# Patient Record
Sex: MALE | Race: WHITE
[De-identification: names, ages, dates, MRNs, and addresses within clinical notes are randomized per-mention and may not be internally consistent; named-entity substitution may affect disease eponyms.]

---

## 2020-07-06 ENCOUNTER — HOSPITAL ENCOUNTER (OUTPATIENT)
Dept: HOSPITAL 76 - LAB.S | Age: 75
Discharge: HOME | End: 2020-07-06
Attending: FAMILY MEDICINE
Payer: MEDICARE

## 2020-07-06 DIAGNOSIS — R94.31: ICD-10-CM

## 2020-07-06 DIAGNOSIS — I25.10: Primary | ICD-10-CM

## 2020-07-06 DIAGNOSIS — D75.1: ICD-10-CM

## 2020-07-06 DIAGNOSIS — E78.5: ICD-10-CM

## 2020-07-06 LAB
ALBUMIN DIAFP-MCNC: 4.5 G/DL (ref 3.2–5.5)
ALBUMIN/GLOB SERPL: 1.9 {RATIO} (ref 1–2.2)
ALP SERPL-CCNC: 40 IU/L (ref 42–121)
ALT SERPL W P-5'-P-CCNC: 59 IU/L (ref 10–60)
ANION GAP SERPL CALCULATED.4IONS-SCNC: 9 MMOL/L (ref 6–13)
AST SERPL W P-5'-P-CCNC: 27 IU/L (ref 10–42)
BASOPHILS NFR BLD AUTO: 0.1 10^3/UL (ref 0–0.1)
BASOPHILS NFR BLD AUTO: 1 %
BILIRUB BLD-MCNC: 1.4 MG/DL (ref 0.2–1)
BUN SERPL-MCNC: 21 MG/DL (ref 6–20)
CALCIUM UR-MCNC: 9.2 MG/DL (ref 8.5–10.3)
CHLORIDE SERPL-SCNC: 101 MMOL/L (ref 101–111)
CHOLEST SERPL-MCNC: 78 MG/DL
CO2 SERPL-SCNC: 30 MMOL/L (ref 21–32)
CREAT SERPLBLD-SCNC: 1 MG/DL (ref 0.6–1.2)
EOSINOPHIL # BLD AUTO: 0.2 10^3/UL (ref 0–0.7)
EOSINOPHIL NFR BLD AUTO: 2.9 %
ERYTHROCYTE [DISTWIDTH] IN BLOOD BY AUTOMATED COUNT: 14.5 % (ref 12–15)
GLOBULIN SER-MCNC: 2.4 G/DL (ref 2.1–4.2)
GLUCOSE SERPL-MCNC: 126 MG/DL (ref 70–100)
HDLC SERPL-MCNC: 30 MG/DL
HDLC SERPL: 2.6 {RATIO} (ref ?–5)
HGB UR QL STRIP: 17.1 G/DL (ref 14–18)
LDLC SERPL CALC-MCNC: 31 MG/DL
LDLC/HDLC SERPL: 1 {RATIO} (ref ?–3.6)
LYMPHOCYTES # SPEC AUTO: 2.1 10^3/UL (ref 1.5–3.5)
LYMPHOCYTES NFR BLD AUTO: 33.8 %
MCH RBC QN AUTO: 30.9 PG (ref 27–31)
MCHC RBC AUTO-ENTMCNC: 34.3 G/DL (ref 32–36)
MCV RBC AUTO: 90.1 FL (ref 80–94)
MONOCYTES # BLD AUTO: 0.6 10^3/UL (ref 0–1)
MONOCYTES NFR BLD AUTO: 10.1 %
NEUTROPHILS # BLD AUTO: 3.2 10^3/UL (ref 1.5–6.6)
NEUTROPHILS # SNV AUTO: 6.2 X10^3/UL (ref 4.8–10.8)
NEUTROPHILS NFR BLD AUTO: 51.2 %
PDW BLD AUTO: 12.6 FL (ref 7.4–11.4)
PLATELET # BLD: 155 10^3/UL (ref 130–450)
PROT SPEC-MCNC: 6.9 G/DL (ref 6.7–8.2)
RBC MAR: 5.53 10^6/UL (ref 4.7–6.1)
SODIUM SERPLBLD-SCNC: 140 MMOL/L (ref 135–145)
VLDLC SERPL-SCNC: 17 MG/DL

## 2020-07-06 PROCEDURE — 83721 ASSAY OF BLOOD LIPOPROTEIN: CPT

## 2020-07-06 PROCEDURE — 80061 LIPID PANEL: CPT

## 2020-07-06 PROCEDURE — 80053 COMPREHEN METABOLIC PANEL: CPT

## 2020-07-06 PROCEDURE — 84443 ASSAY THYROID STIM HORMONE: CPT

## 2020-07-06 PROCEDURE — 36415 COLL VENOUS BLD VENIPUNCTURE: CPT

## 2020-07-06 PROCEDURE — 85025 COMPLETE CBC W/AUTO DIFF WBC: CPT

## 2020-09-25 ENCOUNTER — HOSPITAL ENCOUNTER (OUTPATIENT)
Dept: HOSPITAL 76 - SC | Age: 75
Discharge: HOME | End: 2020-09-25
Attending: NURSE PRACTITIONER
Payer: COMMERCIAL

## 2020-09-25 DIAGNOSIS — R06.83: Primary | ICD-10-CM

## 2020-09-25 DIAGNOSIS — I51.9: ICD-10-CM

## 2020-09-25 DIAGNOSIS — R06.81: ICD-10-CM

## 2020-09-25 PROCEDURE — 95806 SLEEP STUDY UNATT&RESP EFFT: CPT

## 2020-10-13 ENCOUNTER — HOSPITAL ENCOUNTER (OUTPATIENT)
Dept: HOSPITAL 76 - SC | Age: 75
Discharge: HOME | End: 2020-10-13
Attending: NURSE PRACTITIONER
Payer: COMMERCIAL

## 2020-10-13 DIAGNOSIS — E66.3: ICD-10-CM

## 2020-10-13 DIAGNOSIS — G47.10: ICD-10-CM

## 2020-10-13 DIAGNOSIS — I25.10: ICD-10-CM

## 2020-10-13 DIAGNOSIS — R06.83: Primary | ICD-10-CM

## 2020-10-13 DIAGNOSIS — R06.81: ICD-10-CM

## 2020-10-13 PROCEDURE — 99212 OFFICE O/P EST SF 10 MIN: CPT

## 2020-10-13 PROCEDURE — 99213 OFFICE O/P EST LOW 20 MIN: CPT

## 2020-10-13 NOTE — SLEEP CARE CONSULTATION
Information from patient questionnaire entered by Felicita Polk.





I have reviewed and concur with the information entered by Felicita Polk. This 

document represents the service I personally performed and the decisions made by

me, Ute Yanes ARNP.





History of Present Illness


Service Date and Time: 10/13/2020    1658


Initial [place] Sleepiness Scale score: 4 (in 2020)


Current [place] Sleepiness Scale score: 1


Additional HPI information: 





CONRAD KAPOOR returns for follow up and results of the recently performed home 

sleep study.





The patient was informed of the following findings: Patient calculated AHI was 

3.6 showing no significant sleep disordered breathing but he did have an 

elevated supine AHI of 7.7. His study quality was also considered fair due to a 

partial loss of airflow signal.





I explained the pathophysiology behind obstructive sleep apnea. Patient does not

have sleep apnea and was advised how weight gain could increase the risk of 

developing sleep apnea in the future.  Patient does not have significant sleep 

disordered breathing but has elevated AHI in supine position so advised 

positional therapy. Methods to achieve positional management therapy were 

discussed; such as, positioning with pillows, wearing a T-shirt with tennis 

balls sewn into the back, Rematee shirt, Zzomba belt and Slumberbump belt. 

Pamphlets provided on how to obtain the commercially available products.





Patient has light snoring. Snoring can be reduced by weight loss. Weight loss is

best achieved with diet consult. Patient instructed to contact PCP for referral.

Snoring can also be treated with an oral appliance from a dentist. Advised to 

check insurance coverage. In addition, an ENT evaluation can be do to see if 

other treatment is indicated.








Sleep Study





- Results


Type of Sleep Study: Home sleep study


Prior sleep studies: No


Polysomnography/Home Sleep Study results: 





SLEEP TIME AND EFFICIENCY: The sleep study recording began at 11:07:13 PM and 

ended at 08:33:21 AM. Total


recording time was 566.1 minutes. The total sleep time was 517.0 minutes. The 

sleep efficiency was 91.3 percent. The


patient spent 148.6 minutes supine, and spent 368.4 minutes non-supine. The 

patients own estimate of sleep time was


9.40 hours.


RESPIRATORY DATA: The AHI in this report is indexed to sleep time based on 

actigraphy. The AASM defines this as JENNIFER.


The AHI on this type 3 Home Sleep Study may understate the AHI determined on a 

type 1 or 2 study, since EEG is not


monitored resulting in the inability to score non-desaturating hypopneas.


Based on 4% Calculation:


The AHI4% calculation of 3.6 per hour of recording time was based on a total of 

25 scored apneas and 6 scored


hypopneas with 4% desaturations. Supine AHI4%: 7.7 per hour. Non-supine AHI4%: 

2.0 per hour.


Oxygen Summary:


Patient's baseline O2 saturation was 95.0 %. The patient spent 4.3 minutes at an

oxygen saturation less than 90%, and


0.9 minutes less than 85%. The desaturation index was 1.5 events per hour sleep 

time. The lowest saturation was 70.1


%.


SNORING: The percent of the study time spent snoring was 0.0 %. The Snoring 

Count was 4 . The Snoring Index was 0.5


.


PULSE RATE REVIEW: The mean heart rate was 65 beats per minute. The rate ranged 

from a low of 42 to a high of 94


beats per minute.








DIAGNOSIS CODE: suspected Obstructive Sleep Apnea (ICD-10 G47.30)





Allergies and Home Medications


Drug allergies reviewed: Yes (NKDA)


Home medication list reviewed: Yes (no changes)





Review of Systems


Review of systems same as previous: Yes (no changes)





Physical Exam


Heart Rate: 64


O2 Saturation: 95


Height: 6 ft 1.5 in


Weight: 207 lb


Body Mass Index: 26.9


BMI Classification: Overweight





Impression and Plan





1. Suspected Obstructive Sleep Apnea-Hypopnea Syndrome, as suggested by a 

history of loud and irregular snoring, observed cessation of breath while 

asleep, and excessive daytime sleepiness. Patient has a history of coronary 

heart disease. Patient HST study quality fair due to partial loss of airflow 

signal during the study. We got a suboptimal study with an elevated supine AHI 

of 7.7 and non-supine AHI 2.0. I think we should repeat study in lab to obtain 

better quality study and adequate measurement of his sleep disordered breathing.

Patient declined another study at this time. He was advised to inform any of his

doctors/surgeon that he does have mild obstructive sleep apnea when sleeping 

supine to avoid complications with anesthesia. He was instructed to avoid 

sleeping supine to reduce his overall health risk from apnea. Since patients 

apnea is primarily in supine position, patient advised to try positional therapy

and agreed with plan. Patient will follow up as needed or if he decides he would

like to repeat a sleep study.





2. Snoring but no significant sleep disordered breathing. Patient advised that 

often weight loss will reduce snoring as well as apnea risk. An oral appliance 

can also be used for snoring.





* Attempt to lose weight


* Avoid alcohol consumption near bedtime


* The patient is cautioned about driving until sleepiness is completely 

  resolved.


* Return as needed for follow up.














Counseling Topics: Sleeping position


Visit Type: In Office


Time Spent with Patient (minutes): 18


Provider Statement: I spent 100% of the Face to Face Visit with the patient with

greater than 50% spent counseling the patient and coordination of care.

## 2021-06-11 ENCOUNTER — HOSPITAL ENCOUNTER (OUTPATIENT)
Dept: HOSPITAL 76 - LAB.S | Age: 76
Discharge: HOME | End: 2021-06-11
Attending: INTERNAL MEDICINE
Payer: MEDICARE

## 2021-06-11 DIAGNOSIS — I25.10: Primary | ICD-10-CM

## 2021-06-11 LAB
CHOLEST SERPL-MCNC: 77 MG/DL
HDLC SERPL-MCNC: 28 MG/DL
HDLC SERPL: 2.8 {RATIO} (ref ?–5)
LDLC SERPL CALC-MCNC: 29 MG/DL
LDLC/HDLC SERPL: 1 {RATIO} (ref ?–3.6)
TRIGL P FAST SERPL-MCNC: 101 MG/DL
VLDLC SERPL-SCNC: 20 MG/DL

## 2021-06-11 PROCEDURE — 36415 COLL VENOUS BLD VENIPUNCTURE: CPT

## 2021-06-11 PROCEDURE — 83721 ASSAY OF BLOOD LIPOPROTEIN: CPT

## 2021-06-11 PROCEDURE — 80061 LIPID PANEL: CPT

## 2021-08-05 ENCOUNTER — HOSPITAL ENCOUNTER (OUTPATIENT)
Dept: HOSPITAL 76 - LAB.S | Age: 76
Discharge: HOME | End: 2021-08-05
Attending: INTERNAL MEDICINE
Payer: MEDICARE

## 2021-08-05 DIAGNOSIS — Z12.5: ICD-10-CM

## 2021-08-05 DIAGNOSIS — D75.1: Primary | ICD-10-CM

## 2021-08-05 DIAGNOSIS — Z79.899: ICD-10-CM

## 2021-08-05 LAB
ALBUMIN DIAFP-MCNC: 4.2 G/DL (ref 3.2–5.5)
ALBUMIN/GLOB SERPL: 1.8 {RATIO} (ref 1–2.2)
ALP SERPL-CCNC: 44 IU/L (ref 42–121)
ALT SERPL W P-5'-P-CCNC: 77 IU/L (ref 10–60)
ANION GAP SERPL CALCULATED.4IONS-SCNC: 7 MMOL/L (ref 6–13)
AST SERPL W P-5'-P-CCNC: 35 IU/L (ref 10–42)
BASOPHILS NFR BLD AUTO: 0.1 10^3/UL (ref 0–0.1)
BASOPHILS NFR BLD AUTO: 1.5 %
BILIRUB BLD-MCNC: 1.1 MG/DL (ref 0.2–1)
BUN SERPL-MCNC: 18 MG/DL (ref 6–20)
CALCIUM UR-MCNC: 8.8 MG/DL (ref 8.5–10.3)
CHLORIDE SERPL-SCNC: 105 MMOL/L (ref 101–111)
CO2 SERPL-SCNC: 27 MMOL/L (ref 21–32)
CREAT SERPLBLD-SCNC: 0.9 MG/DL (ref 0.6–1.2)
EOSINOPHIL # BLD AUTO: 0.2 10^3/UL (ref 0–0.7)
EOSINOPHIL NFR BLD AUTO: 3.9 %
ERYTHROCYTE [DISTWIDTH] IN BLOOD BY AUTOMATED COUNT: 14.7 % (ref 12–15)
GFRSERPLBLD MDRD-ARVRAT: 82 ML/MIN/{1.73_M2} (ref 89–?)
GLOBULIN SER-MCNC: 2.4 G/DL (ref 2.1–4.2)
GLUCOSE SERPL-MCNC: 132 MG/DL (ref 70–100)
HCT VFR BLD AUTO: 51.9 % (ref 42–52)
HGB UR QL STRIP: 17.7 G/DL (ref 14–18)
LYMPHOCYTES # SPEC AUTO: 1.7 10^3/UL (ref 1.5–3.5)
LYMPHOCYTES NFR BLD AUTO: 31.5 %
MCH RBC QN AUTO: 30.4 PG (ref 27–31)
MCHC RBC AUTO-ENTMCNC: 34.1 G/DL (ref 32–36)
MCV RBC AUTO: 89 FL (ref 80–94)
MONOCYTES # BLD AUTO: 0.6 10^3/UL (ref 0–1)
MONOCYTES NFR BLD AUTO: 10.1 %
NEUTROPHILS # BLD AUTO: 2.8 10^3/UL (ref 1.5–6.6)
NEUTROPHILS # SNV AUTO: 5.4 X10^3/UL (ref 4.8–10.8)
NEUTROPHILS NFR BLD AUTO: 52.1 %
NRBC # BLD AUTO: 0 /100WBC
NRBC # BLD AUTO: 0 X10^3/UL
PDW BLD AUTO: 12.2 FL (ref 7.4–11.4)
PLAT MORPH BLD: (no result)
PLATELET # BLD: 158 10^3/UL (ref 130–450)
PLATELET BLD QL SMEAR: (no result)
POTASSIUM SERPL-SCNC: 4 MMOL/L (ref 3.5–5)
PROT SPEC-MCNC: 6.6 G/DL (ref 6.7–8.2)
RBC MAR: 5.83 10^6/UL (ref 4.7–6.1)
RBC MORPH BLD: (no result)
SODIUM SERPLBLD-SCNC: 139 MMOL/L (ref 135–145)
WBC MORPH BLD: (no result)

## 2021-08-05 PROCEDURE — 84153 ASSAY OF PSA TOTAL: CPT

## 2021-08-05 PROCEDURE — 82728 ASSAY OF FERRITIN: CPT

## 2021-08-05 PROCEDURE — 36415 COLL VENOUS BLD VENIPUNCTURE: CPT

## 2021-08-05 PROCEDURE — 85025 COMPLETE CBC W/AUTO DIFF WBC: CPT

## 2021-08-05 PROCEDURE — 80053 COMPREHEN METABOLIC PANEL: CPT

## 2022-02-25 ENCOUNTER — OFFICE VISIT (OUTPATIENT)
Dept: URBAN - METROPOLITAN AREA CLINIC 46 | Facility: CLINIC | Age: 77
End: 2022-02-25
Payer: MEDICARE

## 2022-02-25 DIAGNOSIS — H40.1123 PRIMARY OPEN-ANGLE GLAUCOMA, SEVERE STAGE, LEFT EYE: ICD-10-CM

## 2022-02-25 DIAGNOSIS — H40.1131 PRIMARY OPEN-ANGLE GLAUCOMA, BILATERAL, MILD STAGE: ICD-10-CM

## 2022-02-25 DIAGNOSIS — L71.8 OTHER ROSACEA: Primary | ICD-10-CM

## 2022-02-25 PROCEDURE — 76514 ECHO EXAM OF EYE THICKNESS: CPT | Performed by: OPHTHALMOLOGY

## 2022-02-25 PROCEDURE — 92133 CPTRZD OPH DX IMG PST SGM ON: CPT | Performed by: OPHTHALMOLOGY

## 2022-02-25 PROCEDURE — 92020 GONIOSCOPY: CPT | Performed by: OPHTHALMOLOGY

## 2022-02-25 PROCEDURE — 99204 OFFICE O/P NEW MOD 45 MIN: CPT | Performed by: OPHTHALMOLOGY

## 2022-02-25 RX ORDER — DOXYCYCLINE HYCLATE 100 MG/1
100 MG TABLET, COATED ORAL
Qty: 60 | Refills: 2 | Status: ACTIVE
Start: 2022-02-25

## 2022-02-25 RX ORDER — LATANOPROST 50 UG/ML
0.005 % SOLUTION OPHTHALMIC
Qty: 2.5 | Refills: 11 | Status: ACTIVE
Start: 2022-02-25

## 2022-02-25 ASSESSMENT — INTRAOCULAR PRESSURE
OS: 24
OD: 14
OD: 18
OS: 21

## 2022-02-25 NOTE — IMPRESSION/PLAN
Impression: Primary open-angle glaucoma, severe stage, left eye: H40.1123. Plan: Start Latanoprost at bedtime in both eyes, Stop Alphagan P in both eyes.

## 2022-02-25 NOTE — IMPRESSION/PLAN
Impression: Primary open-angle glaucoma, mild stage, right eye: H40.1111. Plan: Start Latanoprost at bedtime in both eyes, Stop Alphagan P in both eyes.

## 2022-02-25 NOTE — IMPRESSION/PLAN
Impression: Other rosacea: L71.8. Plan: Start Doxy 100 mg PO twice daily and do warm compress multiple times per day.

## 2022-03-23 ENCOUNTER — TESTING ONLY (OUTPATIENT)
Dept: URBAN - METROPOLITAN AREA CLINIC 46 | Facility: CLINIC | Age: 77
End: 2022-03-23
Payer: MEDICARE

## 2022-03-23 PROCEDURE — 92083 EXTENDED VISUAL FIELD XM: CPT | Performed by: OPHTHALMOLOGY

## 2022-03-25 ENCOUNTER — OFFICE VISIT (OUTPATIENT)
Dept: URBAN - METROPOLITAN AREA CLINIC 46 | Facility: CLINIC | Age: 77
End: 2022-03-25
Payer: MEDICARE

## 2022-03-25 DIAGNOSIS — H40.1122 PRIMARY OPEN-ANGLE GLAUCOMA, MODERATE STAGE, LEFT EYE: ICD-10-CM

## 2022-03-25 DIAGNOSIS — H26.491 OTHER SECONDARY CATARACT, RIGHT EYE: ICD-10-CM

## 2022-03-25 DIAGNOSIS — H40.1111 PRIMARY OPEN-ANGLE GLAUCOMA, MILD STAGE, RIGHT EYE: Primary | ICD-10-CM

## 2022-03-25 PROCEDURE — 99213 OFFICE O/P EST LOW 20 MIN: CPT | Performed by: OPHTHALMOLOGY

## 2022-03-25 ASSESSMENT — INTRAOCULAR PRESSURE
OD: 19
OS: 20
OS: 16
OD: 14

## 2022-03-25 NOTE — IMPRESSION/PLAN
Impression: Primary open-angle glaucoma, mild stage, right eye: H40.1111.  Plan: - Continue as previously prescribed Latanoprost 0.005 % eye drops : Instill one drop in both eyes at bedtime

## 2022-11-29 ENCOUNTER — OFFICE VISIT (OUTPATIENT)
Dept: URBAN - METROPOLITAN AREA CLINIC 50 | Facility: CLINIC | Age: 77
End: 2022-11-29
Payer: MEDICARE

## 2022-11-29 DIAGNOSIS — H26.491 OTHER SECONDARY CATARACT, RIGHT EYE: ICD-10-CM

## 2022-11-29 DIAGNOSIS — H40.1131 PRIMARY OPEN-ANGLE GLAUCOMA, MILD STAGE, BILATERAL: Primary | ICD-10-CM

## 2022-11-29 PROCEDURE — 99214 OFFICE O/P EST MOD 30 MIN: CPT | Performed by: OPHTHALMOLOGY

## 2022-11-29 RX ORDER — DOXYCYCLINE HYCLATE 100 MG/1
100 MG TABLET, COATED ORAL
Qty: 60 | Refills: 2 | Status: ACTIVE
Start: 2022-11-29

## 2022-11-29 RX ORDER — LATANOPROST 50 UG/ML
0.005 % SOLUTION OPHTHALMIC
Qty: 2.5 | Refills: 11 | Status: ACTIVE
Start: 2022-11-29

## 2022-11-29 ASSESSMENT — INTRAOCULAR PRESSURE
OS: 20
OD: 22
OD: 15
OS: 19

## 2023-02-27 ENCOUNTER — TESTING ONLY (OUTPATIENT)
Dept: URBAN - METROPOLITAN AREA CLINIC 46 | Facility: CLINIC | Age: 78
End: 2023-02-27
Payer: MEDICARE

## 2023-02-27 DIAGNOSIS — H40.1131 PRIMARY OPEN-ANGLE GLAUCOMA, BILATERAL, MILD STAGE: Primary | ICD-10-CM

## 2024-04-08 ENCOUNTER — OFFICE VISIT (OUTPATIENT)
Dept: URBAN - METROPOLITAN AREA CLINIC 46 | Facility: CLINIC | Age: 79
End: 2024-04-08
Payer: MEDICARE

## 2024-04-08 DIAGNOSIS — Z96.1 PRESENCE OF INTRAOCULAR LENS: ICD-10-CM

## 2024-04-08 DIAGNOSIS — I10 ESSENTIAL (PRIMARY) HYPERTENSION: Primary | ICD-10-CM

## 2024-04-08 DIAGNOSIS — H26.491 OTHER SECONDARY CATARACT, RIGHT EYE: ICD-10-CM

## 2024-04-08 DIAGNOSIS — H16.143 PUNCTATE KERATITIS, BILATERAL: ICD-10-CM

## 2024-04-08 DIAGNOSIS — H16.223 KERATOCONJUNCTIVITIS SICCA, BILATERAL: ICD-10-CM

## 2024-04-08 DIAGNOSIS — H40.1131 PRIMARY OPEN-ANGLE GLAUCOMA, MILD STAGE, BILATERAL: ICD-10-CM

## 2024-04-08 PROCEDURE — 99204 OFFICE O/P NEW MOD 45 MIN: CPT | Performed by: OPTOMETRIST

## 2024-04-08 PROCEDURE — 92250 FUNDUS PHOTOGRAPHY W/I&R: CPT | Performed by: OPTOMETRIST

## 2024-04-08 ASSESSMENT — INTRAOCULAR PRESSURE
OS: 17
OD: 16

## 2024-06-08 ENCOUNTER — HOSPITAL ENCOUNTER (OUTPATIENT)
Dept: HOSPITAL 76 - EMS | Age: 79
End: 2024-06-08
Payer: MEDICARE

## 2024-06-08 DIAGNOSIS — S51.011A: Primary | ICD-10-CM
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
## 2024-06-10 ENCOUNTER — HOSPITAL ENCOUNTER (OUTPATIENT)
Dept: HOSPITAL 76 - DI.S | Age: 79
Discharge: HOME | End: 2024-06-10
Attending: EMERGENCY MEDICINE
Payer: MEDICARE

## 2024-06-10 DIAGNOSIS — M47.816: ICD-10-CM

## 2024-06-10 DIAGNOSIS — M16.0: Primary | ICD-10-CM

## 2024-06-10 DIAGNOSIS — M51.36: ICD-10-CM

## 2024-06-11 NOTE — XRAY REPORT
PROCEDURE:  Hip w/Pelvis 2-3V RT

 

INDICATIONS:  CONTUSION OF RIGHT HIP

 

TECHNIQUE:  2 views of the hip were acquired.  

 

COMPARISON:  None.

 

FINDINGS:  

 

Bones:  No fractures or dislocations.  No suspicious bony lesions. Moderate right hip joint degenerat
ion. Bony prominence at the junction of the femoral head and neck suggesting CAM impingement. 

 

Mild left hip and bilateral sequela joint degeneration. Moderate degenerative disc and facet disease 
in the lower lumbar spine.   

 

Soft tissues:  No suspicious soft tissue calcifications or masses.  

 

 

IMPRESSION:  

 

1. Moderate degenerative joint disease.

 

2. 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

 

 

Reviewed by: Magali Bennett MD on 6/11/2024 7:26 AM [facility]

Approved by: Magali Bennett MD on 6/11/2024 7:26 AM M[facility]LE

 

 

Station ID:  [id number]

## 2024-06-12 ENCOUNTER — HOSPITAL ENCOUNTER (EMERGENCY)
Dept: HOSPITAL 76 - ED | Age: 79
Discharge: HOME | End: 2024-06-12
Payer: MEDICARE

## 2024-06-12 VITALS — SYSTOLIC BLOOD PRESSURE: 137 MMHG | OXYGEN SATURATION: 98 % | DIASTOLIC BLOOD PRESSURE: 74 MMHG

## 2024-06-12 DIAGNOSIS — N40.0: ICD-10-CM

## 2024-06-12 DIAGNOSIS — S70.01XA: Primary | ICD-10-CM

## 2024-06-12 DIAGNOSIS — M16.11: ICD-10-CM
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
## 2024-06-12 PROCEDURE — 96372 THER/PROPH/DIAG INJ SC/IM: CPT

## 2024-06-12 PROCEDURE — 99284 EMERGENCY DEPT VISIT MOD MDM: CPT

## 2024-06-12 PROCEDURE — 72192 CT PELVIS W/O DYE: CPT

## 2024-06-12 RX ADMIN — OXYCODONE STA MG: 5 TABLET ORAL at 15:02

## 2024-06-12 RX ADMIN — ACETAMINOPHEN STA MG: 325 TABLET ORAL at 15:01

## 2024-06-12 RX ADMIN — KETOROLAC TROMETHAMINE STA MG: 30 INJECTION, SOLUTION INTRAMUSCULAR at 16:50

## 2024-06-12 NOTE — ED PHYSICIAN DOCUMENTATION
History of Present Illness





- Stated complaint


Stated Complaint: GLF, LOWER BACK/PELVIS PX





- Chief complaint


Chief Complaint: Back Pain





- Additonal information


Additional information: 


79-year-old male with history of Parkinson's, hypertension, 

hypercholesterolemia, glaucoma, appendectomy presents emergency department For 

ongoing right hip pain.  Patient was seen at walk-in clinic a couple days ago 

where he had x-rays done and there was no acute findings or abnormalities.  He 

has been on tramadol since then and pain has gotten worse to the right hip the 

point where he is now having a hard time ambulating although he still is able to

ambulate into the emergency department.  No new falls since then.  Patient says 

a couple days ago he lost his balance he fell backward onto tile floor from 2 

stairs up onto his right buttocks.  No incontinence of bowel or bladder.








PD PAST MEDICAL HISTORY





- Past Medical History


Past Medical History: Yes


Cardiovascular: High cholesterol, Hypertension


Respiratory: None


Neuro: Parkinson's


Endocrine/Autoimmune: None


GI: GERD


: None


HEENT: Glaucoma


Psych: None


Musculoskeletal: None


Derm: None





- Past Surgical History


Past Surgical History: Yes


General: EGD, Appendectomy, Colonoscopy


Cardiovascular: Coronary stent, Angioplasty


HEENT: Tonsil/Adenoidectomy





- Present Medications


Home Medications: 


                                Ambulatory Orders











 Medication  Instructions  Recorded  Confirmed


 


lisinopriL [Lisinopril] 5 mg PO DAILY 12/20/15 11/25/19


 


Aspirin [Adult Aspirin Regimen] 81 mg PO DAILY 10/01/19 11/26/19


 


Atorvastatin Calcium 80 mg PO DAILY 10/01/19 11/25/19


 


Brimonidine 0.15% Ophth Drops 1 drops OPTH BID 10/01/19 11/25/19





[Alphagan P 0.15% Ophth Drops]   


 


Metoprolol Succinate [Toprol Xl] 25 mg PO DAILY 19


 


Omeprazole Magnesium [Prilosec] 20 mg PO BID 19


 


oxyCODONE [Roxicodone] 5 mg PO Q4-6H PRN #15 tablet 24 














- Allergies


Allergies/Adverse Reactions: 


                                    Allergies











Allergy/AdvReac Type Severity Reaction Status Date / Time


 


No Known Drug Allergies Allergy   Verified 24 13:45














- Social History


Does the pt smoke?: No


Smoking Status: Never smoker


Does the pt drink ETOH?: Yes


Does the pt have substance abuse?: No





- Immunizations


Immunizations are current?: No





- POLST


Patient has POLST: No





PD ED PE NORMAL





- Vitals


Vital signs reviewed: Yes





- General


General: Alert and oriented X 3, No acute distress, Well developed/nourished





- HEENT


HEENT: Atraumatic, PERRL





- Abdomen


Abdomen: Soft





- Back


Back: No CVA TTP, No spinal TTP





- Extremities


Extremities: Other (Right hip: No crepitus with range of motion, no weakness, 

tenderness with flexion extension of the hip as well as of palpation to the 

right lateral portion of the hip.)





Results





- Vitals


Vitals: 


                               Vital Signs - 24 hr











  24





  13:45 13:57 14:07


 


Temperature 35.7 C L  


 


Heart Rate 71 77 


 


Respiratory 14 17 20





Rate   


 


Blood Pressure 125/71 137/74 H 


 


O2 Saturation 96 98 








                                     Oxygen











O2 Source                      Room air

















- Rads (name of study)


  ** Pelvis CT without


Relevant Findings:: Final report received, EMP independent interpretation of 

test, Other (No acute bony abnormality of the pelvis and hip, right hip and left

 hip degenerative arthritis right worse than left prostatomegaly)





PD Medical Decision Making





- ED course


ED course: 


79-year-old male presents emergency department forRight hip pain.  Recently 

couple days ago he had negative hip x-rays we decided to pursue a CT scan of the

 right hip.  CT scan reveals no bony abnormalities or findings aside from what 

appears to be arthritis right side worse in the left side.  He said that home 

tramadol is not working his wife gave me the tramadol to discard of here and he 

did note that the oxycodone was helping with symptom management so we went ahead

 and gave him prescription of oxycodone for pain management at home.  I am 

prescribing a short course of short-acting opioid pain medication for this 

patient. I have reviewed the patients  and no concerning findings were no

madisyn. I have discussed that the opioids are for short term therapy only, and will

 not be refilled from the ED. He was also told to follow-up with his primary 

care provider for physical therapy referral and Told to follow-up with [facility] for further evaluation.  All questions answered and safe for discharge at 

this time.








Departure





- Departure


Disposition: 01 Home, Self Care


Clinical Impression: 


 Right hip pain





Contusion of right hip


Qualifiers:


 Encounter type: sequela Qualified Code(s): S70.01XS - Contusion of right hip, 

sequela





Instructions:  ED Contusion Hip


Follow-Up: 


[facility] [Provider Group]


Prescriptions: 


oxyCODONE [Roxicodone] 5 mg PO Q4-6H PRN #15 tablet


 PRN Reason: Pain >8


Comments: 


Thank you for trusting us with your care. We have completed the CT scan of the 

pelvis and left hip and what we are seeing is arthritis Actually in both hips 

but more worse on the right.  Of note we are also seeing an enlarged prostate 

this is in the you want you to follow-up with your primary care provider if this

 is new information for you.  I have copied and pasted below the CT results for 

your reading.  I also recommend following up with [facility] for further 

evaluation of this to see if there is any more advanced imaging or other things 

that they may recommend.





I am prescribing a short course of narcotic pain medication for you. These are 

potentially dangerous and addictive medications that should be used carefully.


These medications may constipate you. Take an over-the-counter stool softener 

(docusate) twice daily with plenty of water while taking these medications. If 

you go 24 hours without a bowel movement, take over-the-counter miralax, per 

package instructions.


Do not drink or drive while taking these medications.


If you received narcotic or sedating medications while in the emergency 

department, do not drive for 24 hours.


Store this medication in a safe, secure place and out of reach of children.


It is a violation of federal law to give or sell this medication to another 

person or to use in a manner other than prescribed.


The ED will not refill narcotic prescriptions, including prescriptions lost or 

stolen.


To dispose of unwanted medications:


1. [facility] at [address] in 

[place] has a medication drop box. They accept prescription medications (in 

pill form) Monday through Friday 9:00 a.m. to 5:00 p.m.


2. The [facility] [employer] accepts prescription medications (in

 pill form only) for disposal year round. Call [phone] for more 

information.


3. Contact the [facility] for the next [facility] sponsored prescription 

drug collection event. [phone], [phone], or [phone] 

[phone];


Note that many narcotic pain relievers also contain Tylenol/acetaminophen. 

Please ensure that your total dose of acetaminophen from all sources does not 

exceed 3 g (3000 mg) per day.














Final Report 


PT NAME: CONRAD KAPOOR JR MR#: [id number] REG ER/ED 


AGE: 79 CI DT/TM:  


PCP: OLIVER PEREIRA 


: 1945 ACCT#: [id number] ATT: 


SEX: M ORD: Miles MAIER 


NP 








ACCESSION # [id number] 





EXAM:  CT/PELWO ([id number]) 


PROCEDURE: Pelvis WO 





INDICATIONS: right hip pain post fall 





TECHNIQUE: 


Noncontrast 3 mm axial sections acquired through the bony pelvis, with coronal 

and sagittal 


reformatting. For radiation dose reduction, the following was used: automated 

exposure control, 


adjustment of mA and/or kV according to patient size. 





COMPARISON: None. 





FINDINGS: 


Image quality: Excellent. 





Bones: No acute fracture or or dislocation involving the pelvis and hips. Severe

 right hip 


degenerative arthritis and moderate left hip degenerative arthritis. 





Soft tissues: Moderate prostatomegaly. Calcifications of the vas deferens and 

branches of the 


internal iliacs suggests probable long-standing diabetes. Bilateral fat-

containing inguinal hernias. 





IMPRESSION: 





1. No acute bony abnormality of the pelvis and hips. 





2. Severe right hip degenerative arthritis and moderate left hip degenerative 

arthritis. 





3. Prostatomegaly. 





4. Bilateral fat-containing inguinal hernias. 





Discharge Date/Time: 24 17:04

## 2024-06-12 NOTE — CT REPORT
PROCEDURE:  Pelvis WO

 

INDICATIONS:  right hip pain post fall

 

TECHNIQUE:  

Noncontrast 3 mm axial sections acquired through the bony pelvis, with coronal and sagittal reformatt
ing. For radiation dose reduction, the following was used:  automated exposure control, adjustment of
 mA and/or kV according to patient size. 

 

COMPARISON:  None.

 

FINDINGS:  

Image quality:  Excellent.  

 

Bones:  No acute fracture or or dislocation involving the pelvis and hips. Severe right hip degenerat
adriana arthritis and moderate left hip degenerative arthritis.

 

Soft tissues:  Moderate prostatomegaly. Calcifications of the vas deferens and branches of the intern
al iliacs suggests probable long-standing diabetes. Bilateral fat-containing inguinal hernias.

 

IMPRESSION:  

 

1. No acute bony abnormality of the pelvis and hips.

 

2. Severe right hip degenerative arthritis and moderate left hip degenerative arthritis.

 

3. Prostatomegaly.

 

4. Bilateral fat-containing inguinal hernias.

 

Reviewed by: Kranthi Kaufman MD on 6/12/2024 3:55 PM PDT

Approved by: Kranthi Kaufman MD on 6/12/2024 3:55 PM PDT

 

 

Station ID:  [id number]

## 2024-06-22 ENCOUNTER — HOSPITAL ENCOUNTER (EMERGENCY)
Dept: HOSPITAL 76 - ED | Age: 79
Discharge: HOME | End: 2024-06-22
Payer: MEDICARE

## 2024-06-22 VITALS — SYSTOLIC BLOOD PRESSURE: 117 MMHG | DIASTOLIC BLOOD PRESSURE: 74 MMHG | OXYGEN SATURATION: 99 %

## 2024-06-22 DIAGNOSIS — Z20.822: ICD-10-CM

## 2024-06-22 DIAGNOSIS — Z20.828: ICD-10-CM

## 2024-06-22 DIAGNOSIS — I10: ICD-10-CM

## 2024-06-22 DIAGNOSIS — Z20.818: ICD-10-CM

## 2024-06-22 DIAGNOSIS — J18.9: Primary | ICD-10-CM

## 2024-06-22 LAB
B PARAPERT DNA SPEC QL NAA+PROBE: NOT DETECTED
B PERT DNA SPEC QL NAA+PROBE: NOT DETECTED
C PNEUM DNA NPH QL NAA+NON-PROBE: NOT DETECTED
FLUAV RNA RESP QL NAA+PROBE: NOT DETECTED
HAEM INFLU B DNA SPEC QL NAA+PROBE: NOT DETECTED
HCOV 229E RNA SPEC QL NAA+PROBE: NOT DETECTED
HCOV HKU1 RNA UPPER RESP QL NAA+PROBE: NOT DETECTED
HCOV NL63 RNA ASPIRATE QL NAA+PROBE: NOT DETECTED
HCOV OC43 RNA SPEC QL NAA+PROBE: NOT DETECTED
HMPV AG SPEC QL: NOT DETECTED
HPIV1 RNA NPH QL NAA+PROBE: NOT DETECTED
HPIV2 SPEC QL CULT: NOT DETECTED
HPIV3 AB TITR SER CF: NOT DETECTED {TITER}
HPIV4 RNA SPEC QL NAA+PROBE: NOT DETECTED
M PNEUMO DNA SPEC QL NAA+PROBE: NOT DETECTED
RSV RNA RESP QL NAA+PROBE: NOT DETECTED
RV+EV RNA SPEC QL NAA+PROBE: NOT DETECTED
SARS-COV-2 RNA PNL SPEC NAA+PROBE: NOT DETECTED

## 2024-06-22 PROCEDURE — 71046 X-RAY EXAM CHEST 2 VIEWS: CPT

## 2024-06-22 PROCEDURE — 99284 EMERGENCY DEPT VISIT MOD MDM: CPT

## 2024-06-22 PROCEDURE — 87633 RESP VIRUS 12-25 TARGETS: CPT

## 2024-06-22 RX ADMIN — AZITHROMYCIN MONOHYDRATE STA MG: 250 TABLET ORAL at 06:50

## 2024-06-22 RX ADMIN — AMOXICILLIN AND CLAVULANATE POTASSIUM STA TAB: 875; 125 TABLET, FILM COATED ORAL at 06:50

## 2024-06-22 NOTE — XRAY REPORT
PROCEDURE:  Chest 2V

 

INDICATIONS:  cough

 

TECHNIQUE:  2 views of the chest were acquired.  

 

COMPARISON:  None.

 

FINDINGS:  

 

Surgical changes and devices:  None.  

 

Lungs and pleura:  Lobar pneumonia likely in the right middle lobe. Small right pleural effusion.

 

Mediastinum:  Normal heart size

 

Bones and chest wall:  Degenerative changes

 

 

IMPRESSION:  

 

Right middle lobar pneumonia with small adjacent effusion. Agree with preliminary report.

 

Comment: Recommend surveillance imaging to exclude underlying lesion.

 

Reviewed by: Saji Ace MD on 6/22/2024 7:59 AM PDT

Approved by: Saji Ace MD on 6/22/2024 7:59 AM PDT

 

 

Station ID:  [id number]

## 2024-06-22 NOTE — ED PHYSICIAN DOCUMENTATION
History of Present Illness





- Stated complaint


Stated Complaint: cough





- Chief complaint


Chief Complaint: Resp





- History obtained from


History obtained from: Patient, Family





- Additonal information


Additional information: 





HPI from patient and his spouse (in ED at bedside).


Patient c/o 3 days of moist cough, "low grade fever" (per spouse) with Tmax 99.6

(yesterday). Patient denies dyspnea although patient's wife notes pulse ox at 

home this evening was 88% (has home finger pulse oximeter). The pulse ox was on 

room air. Patient does not have pulmonary diagnoses (such as asthma, COPD) and 

does not use oxygen at home. Denies leg swelling. On ROS, patient reports 

pleuritic right-sided chest pain. 





PD PAST MEDICAL HISTORY





- Past Medical History


Cardiovascular: High cholesterol, Hypertension


Respiratory: None


Neuro: Parkinson's


Endocrine/Autoimmune: None


GI: GERD


: None


HEENT: Glaucoma


Psych: None


Musculoskeletal: None


Derm: None





- Past Surgical History


Past Surgical History: Yes


General: EGD, Appendectomy, Colonoscopy


Cardiovascular: Coronary stent, Angioplasty


HEENT: Tonsil/Adenoidectomy





- Present Medications


Home Medications: 


                                Ambulatory Orders











 Medication  Instructions  Recorded  Confirmed


 


lisinopriL [Lisinopril] 5 mg PO DAILY 12/20/15 06/22/24


 


Aspirin [Adult Aspirin Regimen] 81 mg PO DAILY 10/01/19 06/22/24


 


Atorvastatin Calcium 80 mg PO DAILY 10/01/19 06/22/24


 


Brimonidine 0.15% Ophth Drops 1 drops OPTH DAILY 10/01/19 06/22/24





[Alphagan P 0.15% Ophth Drops]   


 


Metoprolol Succinate [Toprol Xl] 25 mg PO DAILY 11/25/19 06/22/24


 


Omeprazole Magnesium [Prilosec] 20 mg PO BID 11/25/19 06/22/24


 


oxyCODONE [Roxicodone] 5 mg PO Q4-6H PRN #15 tablet 06/12/24 06/22/24


 


Amox/Clav 875/125 [Augmentin 1 tablet PO Q12H #13 tablet 06/22/24 





875/125 Tab]   


 


Azithromycin [Zithromax] 250 mg PO DAILY #4 tablet 06/22/24 


 


Carbidopa/Levodopa 2 tab PO TID 06/22/24 06/22/24





[Carbidopa-Levodopa  Tab]   


 


Cholecalciferol (Vitamin D3) 1,000 unit PO DAILY 06/22/24 06/22/24





[Vitamin D3]   


 


Gabapentin [Neurontin] 600 mg PO DAILY 06/22/24 06/22/24














- Allergies


Allergies/Adverse Reactions: 


                                    Allergies











Allergy/AdvReac Type Severity Reaction Status Date / Time


 


No Known Drug Allergies Allergy   Verified 06/22/24 04:15














- Social History


Does the pt smoke?: No


Smoking Status: Never smoker


Does the pt drink ETOH?: Yes


Does the pt have substance abuse?: No





- Immunizations


Immunizations are current?: No





- POLST


Patient has POLST: No





PD ED PE NORMAL





- Vitals


Vital signs reviewed: Yes





- General


General: Alert and oriented X 3, No acute distress, Well developed/nourished





- Cardiac


Cardiac: RRR, No murmur





- Respiratory


Respiratory: No respiratory distress, Other (diminished breath sounds right 

mid/lower lung fields)





Results





- Vitals


Vitals: 


                               Vital Signs - 24 hr











  06/22/24 06/22/24





  04:15 05:05


 


Temperature 36.4 C L 


 


Heart Rate 94 71


 


Respiratory 16 14





Rate  


 


Blood Pressure 134/85 H 117/74


 


O2 Saturation 96 99








                                     Oxygen











O2 Source                      Room air

















- Labs


Labs: 


                                Laboratory Tests











  06/22/24





  04:47


 


Nasal Adenovirus (PCR)  NOT DETECTED


 


Nasal B. parapertussis DNA (PCR)  NOT DETECTED


 


Nasal Coronavir 229E PCR  NOT DETECTED


 


Nasal Coronavir HKU1 PCR  NOT DETECTED


 


Nasal Coronavir NL63 PCR  NOT DETECTED


 


Nasal Coronavir OC43 PCR  NOT DETECTED


 


Nasal Enterovir/Rhinovir PCR  NOT DETECTED


 


Nasal Influenza B PCR  NOT DETECTED


 


Nasal Influenza A PCR  NOT DETECTED


 


Nasal Parainfluen 1 PCR  NOT DETECTED


 


Nasal Parainfluen 2 PCR  NOT DETECTED


 


Nasal Parainfluen 3 PCR  NOT DETECTED


 


Nasal Parainfluen 4 PCR  NOT DETECTED


 


Nasal RSV (PCR)  NOT DETECTED


 


Nasal B.pertussis DNA PCR  NOT DETECTED


 


Nasal C.pneumoniae (PCR)  NOT DETECTED


 


Fernando Human Metapneumo PCR  NOT DETECTED


 


Nasal M.pneumoniae (PCR)  NOT DETECTED


 


Nasal SARS-CoV-2 (PCR)  NOT DETECTED














- Rads (name of study)


  ** chest xray


Relevant Findings:: Prelim report reviewed, EMP independent interpretation of 

test (I reviewed these images and my interpretation is right middle lobe 

infiltrate with near-consolidation and small right pleural effusion), See rad 

report





PD Medical Decision Making





- ED course


Complexity details: reviewed results, re-evaluated patient, considered 

differential, d/w patient, d/w family


ED course: 





mid-90s pulse ox on room air in ED and in NAD. 


Negative respiratory PCR panel.


CXR demonstrates RML infiltrate c/w pneumonia. He is given 875mg PO augmentin 

and 500mg PO azithromycin and I have electronically submitted these antibiotics 

to patient's pharmacy of choice.


Results reviewed with patient/spouse, return precautions discussed.





Departure





- Departure


Disposition: 01 Home, Self Care


Clinical Impression: 


 Pneumonia





Condition: Good


Instructions:  ED Pneumonia Adult


Prescriptions: 


Amox/Clav 875/125 [Augmentin 875/125 Tab] 1 tablet PO Q12H #13 tablet


Azithromycin [Zithromax] 250 mg PO DAILY #4 tablet


Comments: 


Your chest x-ray shows pneumonia in the middle lobe of your right lung. You were

 given 2 different antibiotics in the emergency department for this (Augmentin 

and azithromycin), and I have electronically submitted prescriptions for both of

 these antibiotics to the [facility] pharmacy in [place].  Note that the course 

of azithromycin will total 5 days (including the dose given in the emergency 

department), whereas the Augmentin total 1 week.


Discharge Date/Time: 06/22/24 06:56

## 2025-02-04 ENCOUNTER — OFFICE VISIT (OUTPATIENT)
Dept: URBAN - METROPOLITAN AREA CLINIC 46 | Facility: CLINIC | Age: 80
End: 2025-02-04
Payer: MEDICARE

## 2025-02-04 DIAGNOSIS — H52.4 PRESBYOPIA: ICD-10-CM

## 2025-02-04 DIAGNOSIS — H16.223 KERATOCONJUNCTIVITIS SICCA, BILATERAL: ICD-10-CM

## 2025-02-04 DIAGNOSIS — Z96.1 PRESENCE OF INTRAOCULAR LENS: ICD-10-CM

## 2025-02-04 DIAGNOSIS — H16.143 PUNCTATE KERATITIS, BILATERAL: ICD-10-CM

## 2025-02-04 DIAGNOSIS — H26.491 OTHER SECONDARY CATARACT, RIGHT EYE: ICD-10-CM

## 2025-02-04 PROCEDURE — 76514 ECHO EXAM OF EYE THICKNESS: CPT | Performed by: OPTOMETRIST

## 2025-02-04 PROCEDURE — 92083 EXTENDED VISUAL FIELD XM: CPT | Performed by: OPTOMETRIST

## 2025-02-04 PROCEDURE — 99214 OFFICE O/P EST MOD 30 MIN: CPT | Performed by: OPTOMETRIST

## 2025-02-04 PROCEDURE — 92015 DETERMINE REFRACTIVE STATE: CPT | Performed by: OPTOMETRIST

## 2025-02-04 PROCEDURE — 92133 CPTRZD OPH DX IMG PST SGM ON: CPT | Performed by: OPTOMETRIST

## 2025-02-04 ASSESSMENT — INTRAOCULAR PRESSURE
OS: 15
OD: 15

## 2025-02-04 ASSESSMENT — KERATOMETRY
OS: 41.17
OD: 41.44

## 2025-02-06 ENCOUNTER — OFFICE VISIT (OUTPATIENT)
Dept: URBAN - METROPOLITAN AREA CLINIC 46 | Facility: CLINIC | Age: 80
End: 2025-02-06
Payer: MEDICARE

## 2025-02-06 DIAGNOSIS — H40.1131 PRIMARY OPEN-ANGLE GLAUCOMA, MILD STAGE, BILATERAL: ICD-10-CM

## 2025-02-06 PROCEDURE — 99214 OFFICE O/P EST MOD 30 MIN: CPT | Performed by: OPHTHALMOLOGY

## 2025-02-06 ASSESSMENT — INTRAOCULAR PRESSURE
OD: 16
OS: 16

## 2025-02-06 ASSESSMENT — KERATOMETRY: OS: 41.06

## 2025-02-25 ENCOUNTER — LASER (OUTPATIENT)
Dept: URBAN - METROPOLITAN AREA SURGERY 25 | Facility: SURGERY | Age: 80
End: 2025-02-25
Payer: MEDICARE

## 2025-02-25 PROCEDURE — 66821 AFTER CATARACT LASER SURGERY: CPT | Performed by: OPHTHALMOLOGY
